# Patient Record
Sex: FEMALE | Race: WHITE | NOT HISPANIC OR LATINO | Employment: FULL TIME | URBAN - METROPOLITAN AREA
[De-identification: names, ages, dates, MRNs, and addresses within clinical notes are randomized per-mention and may not be internally consistent; named-entity substitution may affect disease eponyms.]

---

## 2019-04-28 ENCOUNTER — OFFICE VISIT (OUTPATIENT)
Dept: RETAIL CLINIC | Facility: CLINIC | Age: 59
End: 2019-04-28

## 2019-04-28 VITALS
SYSTOLIC BLOOD PRESSURE: 160 MMHG | TEMPERATURE: 98.1 F | WEIGHT: 196 LBS | HEART RATE: 89 BPM | BODY MASS INDEX: 33.46 KG/M2 | OXYGEN SATURATION: 98 % | HEIGHT: 64 IN | DIASTOLIC BLOOD PRESSURE: 70 MMHG

## 2019-04-28 DIAGNOSIS — L23.7 POISON IVY: Primary | ICD-10-CM

## 2019-04-28 DIAGNOSIS — Z12.11 SCREEN FOR COLON CANCER: ICD-10-CM

## 2019-04-28 DIAGNOSIS — R03.0 BORDERLINE BLOOD PRESSURE: ICD-10-CM

## 2019-04-28 PROCEDURE — 96372 THER/PROPH/DIAG INJ SC/IM: CPT | Performed by: NURSE PRACTITIONER

## 2019-04-28 PROCEDURE — 99203 OFFICE O/P NEW LOW 30 MIN: CPT | Performed by: NURSE PRACTITIONER

## 2019-04-28 RX ORDER — PREDNISONE 10 MG/1
TABLET ORAL
Qty: 30 TABLET | Refills: 0 | Status: SHIPPED | OUTPATIENT
Start: 2019-04-28

## 2019-04-28 RX ORDER — METHYLPREDNISOLONE ACETATE 40 MG/ML
40 INJECTION, SUSPENSION INTRA-ARTICULAR; INTRALESIONAL; INTRAMUSCULAR; SOFT TISSUE ONCE
Status: COMPLETED | OUTPATIENT
Start: 2019-04-28 | End: 2019-04-28

## 2019-04-28 RX ORDER — DIAPER,BRIEF,INFANT-TODD,DISP
EACH MISCELLANEOUS 2 TIMES DAILY
Qty: 28 G | Refills: 0 | Status: SHIPPED | OUTPATIENT
Start: 2019-04-28

## 2019-04-28 RX ADMIN — METHYLPREDNISOLONE ACETATE 40 MG: 40 INJECTION, SUSPENSION INTRA-ARTICULAR; INTRALESIONAL; INTRAMUSCULAR; SOFT TISSUE at 11:52

## 2019-04-28 NOTE — PATIENT INSTRUCTIONS
Poison Ivy Dermatitis  Poison ivy dermatitis is redness and soreness (inflammation) of the skin. It is caused by a chemical that is found on the leaves of the poison ivy plant. You may also have itching, a rash, and blisters. Symptoms often clear up in 1-2 weeks.  You may get this condition by touching a poison ivy plant. You can also get it by touching something that has the chemical on it. This may include animals or objects that have come in contact with the plant.  Follow these instructions at home:  General instructions  · Take or apply over-the-counter and prescription medicines only as told by your doctor.  · If you touch poison ivy, wash your skin with soap and cold water right away.  · Use hydrocortisone creams or calamine lotion as needed to help with itching.  · Take oatmeal baths as needed. Use colloidal oatmeal. You can get this at a pharmacy or grocery store. Follow the instructions on the package.  · Do not scratch or rub your skin.  · While you have the rash, wash your clothes right after you wear them.  Prevention  · Know what poison ivy looks like so you can avoid it. This plant has three leaves with flowering branches on a single stem. The leaves are glossy. They have uneven edges that come to a point at the front.  · If you have touched poison ivy, wash with soap and water right away. Be sure to wash under your fingernails.  · When hiking or camping, wear long pants, a long-sleeved shirt, tall socks, and hiking boots. You can also use a lotion on your skin that helps to prevent contact with the chemical on the plant.  · If you think that your clothes or outdoor gear came in contact with poison ivy, rinse them off with a garden hose before you bring them inside your house.  Contact a doctor if:  · You have open sores in the rash area.  · You have more redness, swelling, or pain in the affected area.  · You have redness that spreads beyond the rash area.  · You have fluid, blood, or pus coming from  the affected area.  · You have a fever.  · You have a rash over a large area of your body.  · You have a rash on your eyes, mouth, or genitals.  · Your rash does not get better after a few days.  Get help right away if:  · Your face swells or your eyes swell shut.  · You have trouble breathing.  · You have trouble swallowing.  This information is not intended to replace advice given to you by your health care provider. Make sure you discuss any questions you have with your health care provider.  Document Released: 01/20/2012 Document Revised: 08/02/2018 Document Reviewed: 05/25/2016  [employer] Interactive Patient Education © 2019 [employer]

## 2019-04-28 NOTE — PROGRESS NOTES
"CASTILLO Huerta is a 58 y.o. female.   Chief Complaint   Patient presents with   • Poison Ivy      History of Present Illness   Presents with itchy rash after being out in the yard on Friday. The rash is on her right hand and has caused facial swelling and upper eyelid puffiness. She is using benadryl orally without improvement.   The following portions of the patient's history were reviewed and updated as appropriate: allergies, current medications, past family history, past medical history, past social history, past surgical history and problem list.    Current Outpatient Medications:   •  hydrocortisone 0.5 % ointment, Apply  topically to the appropriate area as directed 2 (Two) Times a Day., Disp: 28 g, Rfl: 0  •  predniSONE (DELTASONE) 10 MG tablet, Take 4 tabs po q day for 3, then 3 tabs po q day for 3, then 2 tabs q day for 3, then 1 q day for 3 then d/c., Disp: 30 tablet, Rfl: 0    Current Facility-Administered Medications:   •  methylPREDNISolone acetate (DEPO-medrol) injection 40 mg, 40 mg, Intramuscular, Once, Chelo Tomlinson, APRN    No Known Allergies    Review of Systems   Constitutional: Negative.    HENT: Negative.    Respiratory: Negative.    Gastrointestinal: Negative.    Skin: Positive for rash.        Itchy rash on face and left hand. Eye lids swollen    Hematological: Negative.    Psychiatric/Behavioral: Negative.        Objective     Visit Vitals  /70   Pulse 89   Temp 98.1 °F (36.7 °C)   Ht 162.6 cm (64\")   Wt 88.9 kg (196 lb)   SpO2 98%   BMI 33.64 kg/m²         Physical Exam   Constitutional: She is oriented to person, place, and time. She appears well-developed and well-nourished. She is cooperative. She is easily aroused.  Non-toxic appearance. She does not have a sickly appearance. She does not appear ill. No distress.   HENT:   Head: Normocephalic and atraumatic.   Cardiovascular: Normal rate, regular rhythm and normal heart sounds.   Pulmonary/Chest: Effort normal " and breath sounds normal. No stridor. No respiratory distress.   Neurological: She is alert, oriented to person, place, and time and easily aroused.   Skin: Skin is warm and dry. Rash noted. There is erythema.   Face red and mildly swollen with upper eye lids red and puffy. Small vesicular patch on left dorsal aspect of her hand.    Nursing note and vitals reviewed.      Lab Results (last 24 hours)     ** No results found for the last 24 hours. **          Assessment/Plan   Jacqueline was seen today for poison ivy.    Diagnoses and all orders for this visit:    Poison ivy  -     methylPREDNISolone acetate (DEPO-medrol) injection 40 mg  -     predniSONE (DELTASONE) 10 MG tablet; Take 4 tabs po q day for 3, then 3 tabs po q day for 3, then 2 tabs q day for 3, then 1 q day for 3 then d/c.  -     hydrocortisone 0.5 % ointment; Apply  topically to the appropriate area as directed 2 (Two) Times a Day.    Borderline blood pressure    follow up with pcp for blood pressure evaluation.   Risk discussed.     ARA Najera